# Patient Record
Sex: FEMALE | Race: WHITE | URBAN - METROPOLITAN AREA
[De-identification: names, ages, dates, MRNs, and addresses within clinical notes are randomized per-mention and may not be internally consistent; named-entity substitution may affect disease eponyms.]

---
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
## 2018-08-11 ENCOUNTER — NURSE TRIAGE (OUTPATIENT)
Dept: NURSING | Facility: CLINIC | Age: 18
End: 2018-08-11

## 2018-08-11 NOTE — TELEPHONE ENCOUNTER
Reason for Disposition    [1] Bleeding or spotting between regular periods AND [2] occurs more than two cycles (2 months) AND [3] using birth control medicine (pills, patch, Depo-Provera, Implanon, vaginal ring, Mirena IUD)    Additional Information    Negative: Shock suspected (e.g., cold/pale/clammy skin, too weak to stand, low BP, rapid pulse)    Negative: Difficult to awaken or acting confused  (e.g., disoriented, slurred speech)    Negative: Passed out (i.e., lost consciousness, collapsed and was not responding)    Negative: Sounds like a life-threatening emergency to the triager    Negative: SEVERE abdominal pain    Negative: SEVERE dizziness (e.g., unable to stand, requires support to walk, feels like passing out now)    Negative: SEVERE vaginal bleeding (i.e., soaking 2 pads or tampons per hour and present 2 or more hours)    Negative: Patient sounds very sick or weak to the triager    Negative: MODERATE vaginal bleeding (i.e., soaking 1 pad or tampon per hour and present > 6 hours)    Negative: [1] Constant abdominal pain AND [2] present > 2 hours    Negative: Pale skin (pallor) of new onset or worsening    Negative: Passed tissue (e.g., gray-white)    Negative: Taking Coumadin (warfarin) or other strong blood thinner, or known bleeding disorder (e.g., thrombocytopenia)    Negative: [1] Skin bruises or nosebleed AND [2] not caused by an injury    Negative: [1] Periods with > 6 soaked pads or tampons per day AND [2] last > 7 days    Negative: [1] Bleeding or spotting after procedure (e.g., biopsy) or pelvic examination (e.g., pap smear) AND [2] lasts > 7 days    Negative: [1] Bleeding or spotting occurs after sex (Exception: first intercourse) AND [2] lasts > 7 days    Negative: Periods with > 6 soaked pads or tampons per day    Negative: Periods last > 7 days    Negative: [1] Missed period AND [2] has occurred 2 or more times in the last year    Negative: [1] Menstrual cycle < 21 days OR > 35 days AND [2]  occurs more than two cycles (2 months) this past year    Negative: [1] Bleeding or spotting between regular periods AND [2] occurs more than two cycles (2 months) this past year    Protocols used: VAGINAL BLEEDING - ABNORMAL-ADULT-[facility]

## 2018-08-11 NOTE — TELEPHONE ENCOUNTER
Rachelle is on birth control and is having spotting between menstruations and today slightly more.    Denies dizziness or heavy bleeding.